# Patient Record
Sex: FEMALE | Race: BLACK OR AFRICAN AMERICAN | NOT HISPANIC OR LATINO | Employment: UNEMPLOYED | URBAN - METROPOLITAN AREA
[De-identification: names, ages, dates, MRNs, and addresses within clinical notes are randomized per-mention and may not be internally consistent; named-entity substitution may affect disease eponyms.]

---

## 2017-07-18 ENCOUNTER — HISTORICAL (OUTPATIENT)
Dept: ADMINISTRATIVE | Facility: HOSPITAL | Age: 32
End: 2017-07-18

## 2017-07-18 LAB
HAV IGM SERPL QL IA: NONREACTIVE
HBV CORE IGM SERPL QL IA: NONREACTIVE
HBV SURFACE AG SERPL QL IA: NEGATIVE
HCV AB SERPL QL IA: NONREACTIVE
RPR SER QL: NON REACTIVE

## 2017-11-02 ENCOUNTER — HISTORICAL (OUTPATIENT)
Dept: ADMINISTRATIVE | Facility: HOSPITAL | Age: 32
End: 2017-11-02

## 2017-11-02 LAB
APPEARANCE, UA: CLEAR
BACTERIA #/AREA URNS AUTO: ABNORMAL /[HPF]
BILIRUB SERPL-MCNC: NEGATIVE MG/DL
BILIRUB UR QL STRIP: NEGATIVE
BLOOD URINE, POC: NORMAL
CLARITY, POC UA: CLEAR
COLOR UR: YELLOW
COLOR, POC UA: YELLOW
GLUCOSE (UA): NORMAL
GLUCOSE UR QL STRIP: NEGATIVE
HGB UR QL STRIP: NEGATIVE
HYALINE CASTS #/AREA URNS LPF: ABNORMAL /[LPF]
KETONES UR QL STRIP: ABNORMAL
KETONES UR QL STRIP: NORMAL
LEUKOCYTE EST, POC UA: NEGATIVE
LEUKOCYTE ESTERASE UR QL STRIP: NEGATIVE
MUCOUS THREADS URNS QL MICRO: ABNORMAL
NITRITE UR QL STRIP: NEGATIVE
NITRITE, POC UA: NEGATIVE
PH UR STRIP: 6 [PH] (ref 4.5–8)
PH, POC UA: 6.5
PROT UR QL STRIP: 20 MG/DL
PROTEIN, POC: NEGATIVE
RBC #/AREA URNS AUTO: ABNORMAL /[HPF]
SP GR UR STRIP: 1.02 (ref 1–1.03)
SPECIFIC GRAVITY, POC UA: 1.02
SQUAMOUS #/AREA URNS LPF: ABNORMAL /[LPF]
UROBILINOGEN UR STRIP-ACNC: NORMAL
UROBILINOGEN, POC UA: NORMAL
WBC #/AREA URNS AUTO: ABNORMAL /HPF

## 2017-11-04 LAB — FINAL CULTURE: NO GROWTH

## 2017-11-27 ENCOUNTER — HISTORICAL (OUTPATIENT)
Dept: ADMINISTRATIVE | Facility: HOSPITAL | Age: 32
End: 2017-11-27

## 2017-11-27 LAB — B-HCG FREE SERPL-ACNC: 1 MIU/ML

## 2018-07-11 ENCOUNTER — HISTORICAL (OUTPATIENT)
Dept: INTERNAL MEDICINE | Facility: CLINIC | Age: 33
End: 2018-07-11

## 2018-12-11 ENCOUNTER — HISTORICAL (OUTPATIENT)
Dept: RADIOLOGY | Facility: HOSPITAL | Age: 33
End: 2018-12-11

## 2020-10-08 LAB
PAP RECOMMENDATION EXT: NORMAL
PAP SMEAR: NORMAL

## 2022-03-03 ENCOUNTER — HISTORICAL (OUTPATIENT)
Dept: ADMINISTRATIVE | Facility: HOSPITAL | Age: 37
End: 2022-03-03

## 2022-04-09 ENCOUNTER — HISTORICAL (OUTPATIENT)
Dept: ADMINISTRATIVE | Facility: HOSPITAL | Age: 37
End: 2022-04-09

## 2022-04-25 VITALS
OXYGEN SATURATION: 100 % | WEIGHT: 166 LBS | HEIGHT: 64 IN | BODY MASS INDEX: 28.34 KG/M2 | DIASTOLIC BLOOD PRESSURE: 90 MMHG | SYSTOLIC BLOOD PRESSURE: 139 MMHG

## 2022-04-30 NOTE — ED PROVIDER NOTES
Patient:   Winnie Jay             MRN: [id number]            FIN: [id number]               Age:   32 years     Sex:  Female     :  1985   Associated Diagnoses:   None   Author:   Colin Boyd MD      Addendum      Teaching-Supervisory Addendum-Brief   I participated in the following activities of this patients care: the medical history, the physical exam, medical decision making, I have personally interviewed and examined the patient.  All charts and labs and imaging studies were reviewed.  I agree with the resident's findings and exam.  The history and physical were reviewed, the patient was examined, and no change has occurred in the patient's condition since the H&P was completed.   , Patient has left-sided lid edema along with irritation and mild corneal abrasion noted.  She has not ran any fever and does not have any overt pain to the left side of her face or eye.  Extraocular movements are all intact and she does not appear to be in any significant discomfort.  We spoke to the patient about the possibility of periorbital edema and that she should return if she has any worsening of edema or she has any pain that develops in the left eye.  If she has any fever she should return to the emergency department.  I have also advised her against wearing any cosmetic eyelashes and nothing is to be put into her eye for the next 1-2 weeks.  She has been discharged with antibiotic for corneal abrasion..   I personally performed: supervision of the patient's care, the medical history, the physical exam.   The case was discussed with: the resident.   Results interpretation: I agree with the study interpretation in this patient's care, I agree with the documentation of the study interpretation.   Vital signs: Basic Oxygen Information   2018 10:00 CDT      SpO2                      99 %    2018 8:05 CDT       SpO2                      98 %  .   Course: progressing as expected, well controlled.

## 2022-05-02 NOTE — HISTORICAL [facility]
This is a historical note converted from [employer]. Formatting and pictures may have been removed.  Please reference [employer] for original formatting and attached multimedia. Chief Complaint  PT C/O PAIN RT LOWER BACK, AND LOWER FRONT ABD PAIN. PT IS REQUESTING BLOOD WORK. PT RATE PAIN 8/10  History of Present Illness  32-year-old female presents today?requesting?blood work for pregnancy. ?Patient is not due to start her cycle until tomorrow but was [facility] come [facility].? Patient has slight pain?right lower back?and right?pelvic?area?patient feels like is?premenstrual cramps.? Denies any dysuria.  Review of Systems  Constitutional: negative except as stated in HPI  Eye: negative except as stated in HPI  ENMT: negative except as stated in HPI  Respiratory: negative except as stated in HPI  Cardiovascular: negative except as stated in HPI  Gastrointestinal: negative except as stated in HPI  Genitourinary: negative except as stated in HPI  Hema/Lymph: negative except as stated in HPI  Endocrine: negative except as stated in HPI  Immunologic: negative except as stated in HPI  Musculoskeletal: negative except as stated in HPI  Integumentary: negative except as stated in HPI  Neurologic: negative except as stated in HPI  All Other ROS_ ?negative except as stated in HPI  ?  Physical Exam  Vitals & Measurements  T:?37.0? ?C ?(Oral)? HR:?74?(Peripheral)? BP:?142/92?  HT:?165?cm? HT:?165?cm? HT:?165?cm? WT:?64.41?kg? WT:?64.41?kg? WT:?64.41?kg? BMI:?23.66?  General: Alert and oriented,No acute?distress.  Eye: Pupils are equal, round and reactive to light, Extraocular movements are intact.  HENT: Normocephalic.  Neck: Supple, Non-tender, No carotid bruit, No lymphadenopathy.  Respiratory: Lungs are clear to auscultation, Respirations are non-labored, Breath sounds are equal, Symmetrical chest wall expansion.  Cardiovascular: Normal rate, Regular rhythm, No murmur.  Gastrointestinal: Soft,  mild right flank?tenderness?and right adnexal, Non-distended, Normal bowel sounds.  Genitourinary: Voiding well.  Musculoskeletal: Normal range of motion.  Integumentary: Warm, Dry, Intact.  Neurologic: No focal deficits, Cranial Nerves II-XII are grossly intact.  Assessment/Plan  1.?Amenorrhea  ? Will call patient with lab results  Ordered:  After Hrs Visit [id number] PC, Amenorrhea, 17 18:54:00 CST  Office/Outpatient Visit Level 3 New [id number] PC, Amenorrhea, 17 18:54:00 CST  Routine Spec Collect Venipuncture - Lab blood collect, 17 18:53:00 CST, 17 18:53:00 CST  ?  Follow-up PCP if not improved in 2-3 days.? ED precautions discussed if symptoms worsen.   Problem List/Past Medical History  Ongoing  Anxiety  Hypertension  Historical  Calculus, renal  Procedure/Surgical History    kidney stone surgery x 3  Medications  Nor-QD 0.35 mg oral tablet, 0.35 mg= 1 tab(s), Oral, Daily  Allergies  No Known Medication Allergies  Social History  Alcohol - Denies Alcohol Use, 2017  Never  Substance Abuse - Denies Substance Abuse, 2017  Never  Tobacco - Denies Tobacco Use, 2017  Never smoker  Never smoker, Household tobacco concerns: No.  Family History  Family history is unknown

## 2022-05-02 NOTE — HISTORICAL [facility]
This is a historical note converted from [employer]. Formatting and pictures may have been removed.  Please reference [employer] for original formatting and attached multimedia. Chief Complaint  PT C/O URINE SMELL IN UNDERWEAR. STATES STRONG SMELL X 3 DAYS  History of Present Illness  32 y/o WF presents with c/o of dysuria x 3 days. Has fishy smelling odor with nonpruritic vaginal d/c Hx of recurrent BV and reports symptoms same  Review of Systems  Constitutional: negative except as stated in HPI  Eye: negative except as stated in HPI  ENMT: negative except as stated in HPI  Respiratory: negative except as stated in HPI  Cardiovascular: negative except as stated in HPI  Gastrointestinal: negative except as stated in HPI  Genitourinary: negative except as stated in HPI  Hema/Lymph: negative except as stated in HPI  Endocrine: negative except as stated in HPI  Immunologic: negative except as stated in HPI  Musculoskeletal: negative except as stated in HPI  Integumentary: negative except as stated in HPI  Neurologic: negative except as stated in HPI  ?   All Other ROS_ ?negative except as stated in HPI  Physical Exam  Vitals & Measurements  T:?36.8? ?C ?(Oral)? HR:?72?(Peripheral)? RR:?16? BP:?150/98? SpO2:?100%?  HT:?162.5?cm? HT:?162.5?cm? WT:?64.4?kg? WT:?64.4?kg? BMI:?24.39?  General: Alert and oriented, No acute distress.  Eye: Pupils are equal, round and reactive to light, Extraocular movements are intact.  HENT: Normocephalic.? Oropharynx clear  Neck: Supple, Non-tender, No lymphadenopathy.  Respiratory: Lungs are clear to auscultation, Respirations are non-labored, Breath sounds are equal, Symmetrical chest wall expansion.  Cardiovascular: Normal rate, Regular rhythm, No murmur.  : no cva tend, no suprapubic ten  Musculoskeletal: Normal range of motion.  Integumentary: Warm, Dry, Intact.  Neurologic: No focal deficits, Cranial Nerves II-XII are grossly intact.  Assessment/Plan  1.?Dysuria,? Dysuria  Ordered:  Urine  Culture [id number], Routine collect, 17 18:58:00 CDT, Urine, Nurse collect, Stop date 17 18:58:00 CDT, Dysuria  ?  2.?BV (bacterial vaginosis)  ?  Metrogel  ?  f/u with pcp   Problem List/Past Medical History  Ongoing  Anxiety  Hypertension  Historical  Calculus, renal  Procedure/Surgical History    kidney stone surgery x 3  Medications  MetroGel-Vaginal 0.75% vaginal gel with applicator, 1 marlene, VAG, BID  Nor-QD 0.35 mg oral tablet, 0.35 mg= 1 tab(s), Oral, Daily  traMADol 50 mg oral tablet, 50 mg= 1 tab(s), Oral, q12hr, PRN  Allergies  No Known Medication Allergies  Social History  Alcohol - Denies Alcohol Use, 2017  Substance Abuse - Denies Substance Abuse, 2017  Tobacco - Denies Tobacco Use, 2017  Never smoker, Household tobacco concerns: No.  Family History  Family history is unknown

## 2023-02-28 DIAGNOSIS — Z30.2 ENCOUNTER FOR TUBAL LIGATION: Primary | ICD-10-CM

## 2023-04-22 ENCOUNTER — HOSPITAL ENCOUNTER (EMERGENCY)
Facility: HOSPITAL | Age: 38
Discharge: HOME | End: 2023-04-22
Attending: EMERGENCY MEDICINE
Payer: MEDICAID

## 2023-04-22 VITALS
HEIGHT: 64 IN | OXYGEN SATURATION: 100 % | SYSTOLIC BLOOD PRESSURE: 112 MMHG | RESPIRATION RATE: 16 BRPM | DIASTOLIC BLOOD PRESSURE: 75 MMHG | TEMPERATURE: 98 F | WEIGHT: 122.69 LBS | HEART RATE: 86 BPM | BODY MASS INDEX: 20.95 KG/M2

## 2023-04-22 DIAGNOSIS — F51.01 PRIMARY INSOMNIA: ICD-10-CM

## 2023-04-22 DIAGNOSIS — F41.1 ANXIETY REACTION: Primary | ICD-10-CM

## 2023-04-22 DIAGNOSIS — F32.A DEPRESSION, UNSPECIFIED DEPRESSION TYPE: ICD-10-CM

## 2023-04-22 DIAGNOSIS — F41.9 ANXIETY: ICD-10-CM

## 2023-04-22 LAB
ALBUMIN SERPL-MCNC: 4.4 G/DL (ref 3.5–5)
ALBUMIN/GLOB SERPL: 1.6 RATIO (ref 1.1–2)
ALP SERPL-CCNC: 63 UNIT/L (ref 40–150)
ALT SERPL-CCNC: 25 UNIT/L (ref 0–55)
APPEARANCE UR: CLEAR
AST SERPL-CCNC: 19 UNIT/L (ref 5–34)
B-HCG SERPL QL: NEGATIVE
BACTERIA #/AREA URNS AUTO: ABNORMAL /HPF
BASOPHILS # BLD AUTO: 0.02 X10(3)/MCL (ref 0–0.2)
BASOPHILS NFR BLD AUTO: 0.2 %
BILIRUB UR QL STRIP.AUTO: NEGATIVE MG/DL
BILIRUBIN DIRECT+TOT PNL SERPL-MCNC: 0.9 MG/DL
BUN SERPL-MCNC: 14.5 MG/DL (ref 7–18.7)
CALCIUM SERPL-MCNC: 9.7 MG/DL (ref 8.4–10.2)
CHLORIDE SERPL-SCNC: 103 MMOL/L (ref 98–107)
CO2 SERPL-SCNC: 28 MMOL/L (ref 22–29)
COLOR UR AUTO: YELLOW
CREAT SERPL-MCNC: 0.86 MG/DL (ref 0.55–1.02)
EOSINOPHIL # BLD AUTO: 0.1 X10(3)/MCL (ref 0–0.9)
EOSINOPHIL NFR BLD AUTO: 1 %
ERYTHROCYTE [DISTWIDTH] IN BLOOD BY AUTOMATED COUNT: 13.2 % (ref 11.5–17)
GFR SERPLBLD CREATININE-BSD FMLA CKD-EPI: >60 MLS/MIN/1.73/M2
GLOBULIN SER-MCNC: 2.8 GM/DL (ref 2.4–3.5)
GLUCOSE SERPL-MCNC: 92 MG/DL (ref 74–100)
GLUCOSE UR QL STRIP.AUTO: NORMAL MG/DL
HCT VFR BLD AUTO: 39.2 % (ref 37–47)
HGB BLD-MCNC: 12.7 G/DL (ref 12–16)
HYALINE CASTS #/AREA URNS LPF: ABNORMAL /LPF
IMM GRANULOCYTES # BLD AUTO: 0.02 X10(3)/MCL (ref 0–0.04)
IMM GRANULOCYTES NFR BLD AUTO: 0.2 %
KETONES UR QL STRIP.AUTO: NEGATIVE MG/DL
LEUKOCYTE ESTERASE UR QL STRIP.AUTO: NEGATIVE UNIT/L
LYMPHOCYTES # BLD AUTO: 1.81 X10(3)/MCL (ref 0.6–4.6)
LYMPHOCYTES NFR BLD AUTO: 18 %
MCH RBC QN AUTO: 27.7 PG (ref 27–31)
MCHC RBC AUTO-ENTMCNC: 32.4 G/DL (ref 33–36)
MCV RBC AUTO: 85.4 FL (ref 80–94)
MONOCYTES # BLD AUTO: 0.73 X10(3)/MCL (ref 0.1–1.3)
MONOCYTES NFR BLD AUTO: 7.3 %
MUCOUS THREADS URNS QL MICRO: ABNORMAL /LPF
NEUTROPHILS # BLD AUTO: 7.37 X10(3)/MCL (ref 2.1–9.2)
NEUTROPHILS NFR BLD AUTO: 73.3 %
NITRITE UR QL STRIP.AUTO: NEGATIVE
NRBC BLD AUTO-RTO: 0 %
PH UR STRIP.AUTO: 7 [PH]
PLATELET # BLD AUTO: 263 X10(3)/MCL (ref 130–400)
PMV BLD AUTO: 10.9 FL (ref 7.4–10.4)
POTASSIUM SERPL-SCNC: 3.9 MMOL/L (ref 3.5–5.1)
PROT SERPL-MCNC: 7.2 GM/DL (ref 6.4–8.3)
PROT UR QL STRIP.AUTO: ABNORMAL MG/DL
RBC # BLD AUTO: 4.59 X10(6)/MCL (ref 4.2–5.4)
RBC #/AREA URNS AUTO: ABNORMAL /HPF
RBC UR QL AUTO: NEGATIVE UNIT/L
SODIUM SERPL-SCNC: 139 MMOL/L (ref 136–145)
SP GR UR STRIP.AUTO: 1.02
SQUAMOUS #/AREA URNS LPF: ABNORMAL /HPF
UROBILINOGEN UR STRIP-ACNC: NORMAL MG/DL
WBC # SPEC AUTO: 10.1 X10(3)/MCL (ref 4.5–11.5)
WBC #/AREA URNS AUTO: ABNORMAL /HPF

## 2023-04-22 PROCEDURE — 81001 URINALYSIS AUTO W/SCOPE: CPT | Performed by: EMERGENCY MEDICINE

## 2023-04-22 PROCEDURE — 80053 COMPREHEN METABOLIC PANEL: CPT | Performed by: EMERGENCY MEDICINE

## 2023-04-22 PROCEDURE — 85025 COMPLETE CBC W/AUTO DIFF WBC: CPT | Performed by: EMERGENCY MEDICINE

## 2023-04-22 PROCEDURE — 81025 URINE PREGNANCY TEST: CPT | Performed by: EMERGENCY MEDICINE

## 2023-04-22 PROCEDURE — 99283 EMERGENCY DEPT VISIT LOW MDM: CPT

## 2023-04-22 RX ORDER — HYDROXYZINE PAMOATE 25 MG/1
25 CAPSULE ORAL EVERY 8 HOURS PRN
Qty: 30 CAPSULE | Refills: 1 | Status: SHIPPED | OUTPATIENT
Start: 2023-04-22

## 2023-04-22 RX ORDER — ACETAMINOPHEN AND CODEINE PHOSPHATE 120; 12 MG/5ML; MG/5ML
1 SOLUTION ORAL
COMMUNITY
Start: 2023-03-09
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
## 2023-09-12 ENCOUNTER — PATIENT OUTREACH (OUTPATIENT)
Dept: ADMINISTRATIVE | Facility: HOSPITAL | Age: 38
End: 2023-09-12
Payer: MEDICAID

## 2023-10-24 ENCOUNTER — TELEPHONE (OUTPATIENT)
Dept: GYNECOLOGY | Facility: CLINIC | Age: 38
End: 2023-10-24
Payer: MEDICAID

## 2024-08-07 DIAGNOSIS — N13.30 HYDRONEPHROSIS, UNSPECIFIED HYDRONEPHROSIS TYPE: Primary | ICD-10-CM
